# Patient Record
Sex: MALE | Race: WHITE | NOT HISPANIC OR LATINO | URBAN - METROPOLITAN AREA
[De-identification: names, ages, dates, MRNs, and addresses within clinical notes are randomized per-mention and may not be internally consistent; named-entity substitution may affect disease eponyms.]

---

## 2021-04-13 DIAGNOSIS — Z23 ENCOUNTER FOR IMMUNIZATION: ICD-10-CM

## 2021-06-11 PROCEDURE — 88305 TISSUE EXAM BY PATHOLOGIST: CPT | Performed by: COLON & RECTAL SURGERY

## 2021-06-14 ENCOUNTER — LAB REQUISITION (OUTPATIENT)
Dept: LAB | Facility: HOSPITAL | Age: 66
End: 2021-06-14
Attending: COLON & RECTAL SURGERY

## 2021-06-14 DIAGNOSIS — Z12.11 ENCOUNTER FOR SCREENING FOR MALIGNANT NEOPLASM OF COLON: ICD-10-CM

## 2021-06-15 LAB
CASE RPRT: NORMAL
CLINICAL INFO: NORMAL
PATH REPORT.FINAL DX SPEC: NORMAL
PATH REPORT.GROSS SPEC: NORMAL

## 2022-03-30 ENCOUNTER — TRANSCRIBE ORDERS (OUTPATIENT)
Dept: SCHEDULING | Age: 67
End: 2022-03-30

## 2022-03-30 DIAGNOSIS — C61 MALIGNANT NEOPLASM OF PROSTATE (CMS/HCC): Primary | ICD-10-CM

## 2022-04-07 ENCOUNTER — HOSPITAL ENCOUNTER (OUTPATIENT)
Dept: RADIOLOGY | Facility: CLINIC | Age: 67
Discharge: HOME | End: 2022-04-07
Attending: UROLOGY
Payer: COMMERCIAL

## 2022-04-07 DIAGNOSIS — C61 MALIGNANT NEOPLASM OF PROSTATE (CMS/HCC): ICD-10-CM

## 2022-04-13 ENCOUNTER — HOSPITAL ENCOUNTER (OUTPATIENT)
Dept: RADIOLOGY | Facility: CLINIC | Age: 67
Discharge: HOME | End: 2022-04-13
Attending: UROLOGY
Payer: COMMERCIAL

## 2022-04-13 RX ORDER — GADOBUTROL 604.72 MG/ML
9.3 INJECTION INTRAVENOUS ONCE
Status: COMPLETED | OUTPATIENT
Start: 2022-04-13 | End: 2022-04-13

## 2022-04-13 RX ADMIN — GADOBUTROL 9.3 ML: 604.72 INJECTION INTRAVENOUS at 09:23

## 2023-06-09 ENCOUNTER — TRANSCRIBE ORDERS (OUTPATIENT)
Dept: SCHEDULING | Age: 68
End: 2023-06-09

## 2023-06-09 DIAGNOSIS — C61 MALIGNANT NEOPLASM OF PROSTATE (CMS/HCC): Primary | ICD-10-CM

## 2023-07-12 ENCOUNTER — HOSPITAL ENCOUNTER (OUTPATIENT)
Dept: RADIOLOGY | Facility: CLINIC | Age: 68
Discharge: HOME | End: 2023-07-12
Attending: UROLOGY
Payer: COMMERCIAL

## 2023-07-12 DIAGNOSIS — C61 MALIGNANT NEOPLASM OF PROSTATE (CMS/HCC): ICD-10-CM

## 2023-07-12 RX ORDER — GADOBUTROL 604.72 MG/ML
9 INJECTION INTRAVENOUS ONCE
Status: COMPLETED | OUTPATIENT
Start: 2023-07-12 | End: 2023-07-12

## 2023-07-12 RX ADMIN — GADOBUTROL 9 ML: 604.72 INJECTION INTRAVENOUS at 15:00

## 2023-07-26 ENCOUNTER — HOSPITAL ENCOUNTER (OUTPATIENT)
Dept: RADIOLOGY | Facility: CLINIC | Age: 68
Discharge: HOME | End: 2023-07-26
Attending: UROLOGY
Payer: COMMERCIAL

## 2023-07-26 DIAGNOSIS — R93.89 ABNORMAL FINDINGS ON DIAGNOSTIC IMAGING OF OTHER SPECIFIED BODY STRUCTURES: ICD-10-CM

## 2024-11-04 ENCOUNTER — TELEPHONE (OUTPATIENT)
Dept: COLORECTAL SURGERY | Facility: CLINIC | Age: 69
End: 2024-11-04
Payer: COMMERCIAL

## 2024-11-12 ENCOUNTER — TELEPHONE (OUTPATIENT)
Dept: COLORECTAL SURGERY | Facility: CLINIC | Age: 69
End: 2024-11-12

## 2024-11-12 ENCOUNTER — OFFICE VISIT (OUTPATIENT)
Dept: COLORECTAL SURGERY | Facility: CLINIC | Age: 69
End: 2024-11-12
Payer: COMMERCIAL

## 2024-11-12 VITALS — HEIGHT: 70 IN | BODY MASS INDEX: 28.63 KG/M2 | OXYGEN SATURATION: 95 % | WEIGHT: 200 LBS | HEART RATE: 80 BPM

## 2024-11-12 DIAGNOSIS — K59.1 FUNCTIONAL DIARRHEA: Primary | ICD-10-CM

## 2024-11-12 DIAGNOSIS — R15.9 FULL INCONTINENCE OF FECES: ICD-10-CM

## 2024-11-12 DIAGNOSIS — L29.0 PRURITUS ANI: ICD-10-CM

## 2024-11-12 DIAGNOSIS — K62.89 RECTAL PAIN: ICD-10-CM

## 2024-11-12 PROCEDURE — 3008F BODY MASS INDEX DOCD: CPT | Performed by: COLON & RECTAL SURGERY

## 2024-11-12 PROCEDURE — 99204 OFFICE O/P NEW MOD 45 MIN: CPT | Performed by: COLON & RECTAL SURGERY

## 2024-11-12 RX ORDER — SODIUM PICOSULFATE, MAGNESIUM OXIDE, AND ANHYDROUS CITRIC ACID 12; 3.5; 1 G/175ML; G/175ML; MG/175ML
5 LIQUID ORAL DAILY
Qty: 175 ML | Refills: 0 | Status: SHIPPED | OUTPATIENT
Start: 2024-11-12 | End: 2024-11-14

## 2024-11-12 NOTE — PROGRESS NOTES
Referring Provider: No ref. provider found     Efrain Barnes is a 69 y.o. male is present in the office today for   Chief Complaint   Patient presents with    Hemorrhoids     Patient is being seen for hemorrhoids which has been bothering him for about a year, also recent episodes of incontinence.Hemorrhoidal pain.            Past Medical History:   Diagnosis Date    Prostate cancer (CMS/HCC)     Shortness of breath     Pt is being followed by a pulmonologist Thai Santana       Past Surgical History   Procedure Laterality Date    Ankle surgery      fracture--hardware    Appendectomy      Cataract extraction Right     Colonoscopy w/ polypectomy  06/11/2021    Dr. Blunt    Knee arthroscopy Right     meniscus    Retinal detachment surgery          Social History     Socioeconomic History    Marital status:      Spouse name: None    Number of children: None    Years of education: None    Highest education level: None   Tobacco Use    Smoking status: Every Day     Types: Cigars    Smokeless tobacco: Never   Vaping Use    Vaping status: Never Used   Substance and Sexual Activity    Alcohol use: Yes     Comment: 3-6 per wek    Drug use: Never    Sexual activity: Defer       Family History   Problem Relation Name Age of Onset    Lung cancer Biological Mother      Lung cancer Biological Father         Sulfa (sulfonamide antibiotics)    No current outpatient medications      Review of Systems       Vitals:    11/12/24 1431   Pulse: 80   SpO2: 95%       Body mass index is 28.7 kg/m².      Physical Exam              Assessment/Plan      Full incontinence of feces  Efrain is a pleasant gentleman who comes to see me today secondary to a recent issue of incontinence.  This seems to occur after bowel movements and a shower.  Upon movement, he gets looser stools leaking.  He also has some pain from the rectal region but no significant bleeding.    The patient has not had a colonoscopy in many years and I believe that we should go  forward for routine screening purposes as well as evaluation for the above-mentioned issues.  In addition, I have asked the patient to look into the diet to see if he can manipulate the fiber to improve the form of his stools.  This may also help with the incontinence and the hemorrhoidal discomfort.  The patient is agreeable and we will proceed with diet change and colonoscopy scheduling.    Dietary fiber and water were discussed at length with the patient. We went over options and changes they can try based on their personal diet history.     This patient will need to schedule a colonoscopy. We discussed the procedure as well as the risks of the procedure including bleeding, perforation and organ injury. The prep and other options were discussed as well. All of the patient's questions were answered.               No orders of the defined types were placed in this encounter.       Hugo Blunt MD  11/12/2024

## 2024-11-12 NOTE — ASSESSMENT & PLAN NOTE
Efrain is a pleasant gentleman who comes to see me today secondary to a recent issue of incontinence.  This seems to occur after bowel movements and a shower.  Upon movement, he gets looser stools leaking.  He also has some pain from the rectal region but no significant bleeding.    The patient has not had a colonoscopy in many years and I believe that we should go forward for routine screening purposes as well as evaluation for the above-mentioned issues.  In addition, I have asked the patient to look into the diet to see if he can manipulate the fiber to improve the form of his stools.  This may also help with the incontinence and the hemorrhoidal discomfort.  The patient is agreeable and we will proceed with diet change and colonoscopy scheduling.    Dietary fiber and water were discussed at length with the patient. We went over options and changes they can try based on their personal diet history.     This patient will need to schedule a colonoscopy. We discussed the procedure as well as the risks of the procedure including bleeding, perforation and organ injury. The prep and other options were discussed as well. All of the patient's questions were answered.

## 2025-01-08 PROCEDURE — 45385 COLONOSCOPY W/LESION REMOVAL: CPT | Performed by: COLON & RECTAL SURGERY
